# Patient Record
Sex: MALE | Race: WHITE | Employment: OTHER | URBAN - METROPOLITAN AREA
[De-identification: names, ages, dates, MRNs, and addresses within clinical notes are randomized per-mention and may not be internally consistent; named-entity substitution may affect disease eponyms.]

---

## 2017-12-26 PROBLEM — J10.1 INFLUENZA A: Status: ACTIVE | Noted: 2017-12-26

## 2017-12-26 PROBLEM — J15.9 BACTERIAL PNEUMONIA: Status: ACTIVE | Noted: 2017-12-26

## 2017-12-26 PROBLEM — J80 ARDS (ADULT RESPIRATORY DISTRESS SYNDROME) (HCC): Status: ACTIVE | Noted: 2017-12-26

## 2017-12-26 PROBLEM — A41.9 SEVERE SEPSIS (HCC): Status: ACTIVE | Noted: 2017-12-26

## 2017-12-26 PROBLEM — J96.01 ACUTE RESPIRATORY FAILURE WITH HYPOXIA (HCC): Status: ACTIVE | Noted: 2017-12-26

## 2017-12-26 PROBLEM — F41.0 PANIC DISORDER WITHOUT AGORAPHOBIA: Status: ACTIVE | Noted: 2017-12-26

## 2017-12-26 PROBLEM — R65.20 SEVERE SEPSIS (HCC): Status: ACTIVE | Noted: 2017-12-26

## 2018-01-03 ENCOUNTER — TELEPHONE (OUTPATIENT)
Dept: PHARMACY | Facility: CLINIC | Age: 68
End: 2018-01-03

## 2018-01-05 ENCOUNTER — SCHEDULED TELEPHONE ENCOUNTER (OUTPATIENT)
Dept: PHARMACY | Facility: CLINIC | Age: 68
End: 2018-01-05

## 2018-01-05 ENCOUNTER — TELEPHONE (OUTPATIENT)
Dept: PHARMACY | Facility: CLINIC | Age: 68
End: 2018-01-05

## 2018-01-05 NOTE — TELEPHONE ENCOUNTER
[address] [facility]  Heart Failure Service    Follow up phone call: Wt Readings from Last 3 Encounters:   12/27/17 179 lb 0.2 oz (81.2 kg)      I called today and spoke with his spouse. We had talked to Mr. Escobedo 1/3/18 and he refused to schedule a Well[facility] heart failure service appt. At that time. He mentioned being too dizzy and also too dizzy to weigh himself. He cut our conversation short at that time. Are you having any issues that need immediate attention? No: however, Mr. Milagro Pan continues to not feel well and has cancelled his f/u appts. Cancelled Dr. Genny Gomez 1/3/18 appt and plans to cancel [facility] 1/9/18 appt. I stressed the importance of follow up with his physicians and I stressed the seriousness of his ill health at this time and my concern for his safety. (He had Pneumonia, the flu, sepsis and heart failure, as well as elevated troponin). She expresses understanding and is concerned as well. She reports Mr. Escobedo may be having difficulty coming to terms with the term heart failure. I explained what heart failure meant. Have you been checking your weight daily? No: but she was able to weigh him today   If not, the patient was encouraged to do so. Dry weight = 160-163   What is your weight today? 163. I explained that I felt this was a reasonable weight. He came into [facility] at 167 pounds. His weight at discharge states 179. I am afraid 179 is not accurate. Please call the [employer] at [phone] or your Cardiologist ([address] [address] @ [phone]) if a weight gain of 3 pounds or more in one day  or 5 pounds or more in a week or above your dry weight. [x]  Follow your low sodium diet of 2,000-2,400mg per day. [x]  Limit how much fluid you drink  to 1,500-2,000mL (48-60 ounces) per day.     [x]  Do not take any NSAIDs (non steroidal anti inflammatory drugs) like Aleve          (naproxen), Advil and Motrin (ibuprofen), Mobic

## 2018-01-08 ENCOUNTER — TELEPHONE (OUTPATIENT)
Dept: CARDIOLOGY CLINIC | Age: 68
End: 2018-01-08

## 2018-01-08 NOTE — TELEPHONE ENCOUNTER
Dr. Manus Najjar,  [address] [facility] notes to me concerning the patient's missed appointments and the patient just \"not feeling well enough\" to attend his appointments. Spoke with the patient's wife and he is still just weak and taking a while to recover. She did cancel his appointment for tomorrow 1/9/18, however he is going to see Dr. Sarah Shah, his PCP, on 1/10/18. She states his weight is stable at 163 lbs, he does not have increased shortness of breath and/or increased swelling. She also denies he has any fevers. She said he is just weak. She was able to verbalize heart failure guidelines and said that the [facility] reviewed them at length with her. She will call our office after her 's appointment on Wednesday with an update and to reschedule.      (He had Pneumonia, the flu, sepsis and heart failure, as well as elevated troponin-during admission 12/26/17)

## 2018-01-12 ENCOUNTER — SCHEDULED TELEPHONE ENCOUNTER (OUTPATIENT)
Dept: PHARMACY | Facility: CLINIC | Age: 68
End: 2018-01-12

## 2018-01-12 NOTE — TELEPHONE ENCOUNTER
[address] [facility]  Heart Failure Service    Follow up phone call:    Reached out to Mr. Escobedo to check on his status with regards to heart failure and overall health - no answer. PLAN:   I will attempt again at a later time today. See telephone call from 1/5/18. Mr. Audrey Dunham had cancelled his f/u appts at that time due to feeling so ill. I had a lengthy conversation with his spouse at that time. Will follow up to be sure dizziness and ill feeling has resolved. Will check his weight to be sure he has not gained weight above his dry weight of 160-163 pounds.  Will encourage f/u with PCP and/or [address] [place] [address]  Heart Failure Service  [phone]

## 2018-01-12 NOTE — TELEPHONE ENCOUNTER
Mrs. Jeanine Dozier returned my call. She reports Mr. Escobedo saw Dr. Grayson Wang yesterday. She reports Mr. Escobedo health has improved, but not yet 100%. Dr. Grayson Wang referred him to a cardiologist.    She asked that we not call them any more. They will call if they have any questions.     Yaya Michaels, [address] [facility]  Heart Failure Service  [phone]

## 2018-04-11 PROBLEM — J10.1 INFLUENZA A: Status: RESOLVED | Noted: 2017-12-26 | Resolved: 2018-04-11

## 2022-01-01 ENCOUNTER — APPOINTMENT (OUTPATIENT)
Dept: CT IMAGING | Age: 72
DRG: 315 | End: 2022-01-01
Payer: MEDICARE

## 2022-01-01 ENCOUNTER — HOSPITAL ENCOUNTER (INPATIENT)
Age: 72
LOS: 2 days | DRG: 315 | End: 2022-02-04
Attending: EMERGENCY MEDICINE | Admitting: INTERNAL MEDICINE
Payer: MEDICARE

## 2022-01-01 VITALS
WEIGHT: 189.31 LBS | HEART RATE: 101 BPM | BODY MASS INDEX: 25.68 KG/M2 | SYSTOLIC BLOOD PRESSURE: 116 MMHG | RESPIRATION RATE: 20 BRPM | DIASTOLIC BLOOD PRESSURE: 81 MMHG | TEMPERATURE: 97.5 F | OXYGEN SATURATION: 70 %

## 2022-01-01 DIAGNOSIS — N17.9 ACUTE RENAL FAILURE, UNSPECIFIED ACUTE RENAL FAILURE TYPE (HCC): Primary | ICD-10-CM

## 2022-01-01 DIAGNOSIS — I73.9 PAD (PERIPHERAL ARTERY DISEASE) (HCC): ICD-10-CM

## 2022-01-01 DIAGNOSIS — I70.0 AORTIC OCCLUSION (HCC): ICD-10-CM

## 2022-01-01 DIAGNOSIS — L03.115 CELLULITIS OF RIGHT LOWER EXTREMITY: ICD-10-CM

## 2022-01-01 DIAGNOSIS — M79.604 RIGHT LEG PAIN: ICD-10-CM

## 2022-01-01 LAB
A/G RATIO: 1.1 (ref 1.1–2.2)
ALBUMIN SERPL-MCNC: 3.6 G/DL (ref 3.4–5)
ALP BLD-CCNC: 124 U/L (ref 40–129)
ALT SERPL-CCNC: 7 U/L (ref 10–40)
ANION GAP SERPL CALCULATED.3IONS-SCNC: 15 MMOL/L (ref 3–16)
APTT: 32.1 SEC (ref 26.2–38.6)
AST SERPL-CCNC: 15 U/L (ref 15–37)
BASOPHILS ABSOLUTE: 0 K/UL (ref 0–0.2)
BASOPHILS RELATIVE PERCENT: 0.7 %
BILIRUB SERPL-MCNC: 1.7 MG/DL (ref 0–1)
BUN BLDV-MCNC: 102 MG/DL (ref 7–20)
CALCIUM SERPL-MCNC: 9.4 MG/DL (ref 8.3–10.6)
CHLORIDE BLD-SCNC: 100 MMOL/L (ref 99–110)
CO2: 22 MMOL/L (ref 21–32)
CREAT SERPL-MCNC: 3 MG/DL (ref 0.8–1.3)
EKG DIAGNOSIS: NORMAL
EKG Q-T INTERVAL: 448 MS
EKG QRS DURATION: 182 MS
EKG QTC CALCULATION (BAZETT): 586 MS
EKG R AXIS: -60 DEGREES
EKG T AXIS: 122 DEGREES
EKG VENTRICULAR RATE: 103 BPM
EOSINOPHILS ABSOLUTE: 0 K/UL (ref 0–0.6)
EOSINOPHILS RELATIVE PERCENT: 0.3 %
GFR AFRICAN AMERICAN: 25
GFR NON-AFRICAN AMERICAN: 21
GLUCOSE BLD-MCNC: 95 MG/DL (ref 70–99)
GRAM STAIN RESULT: ABNORMAL
HCT VFR BLD CALC: 45.1 % (ref 40.5–52.5)
HEMOGLOBIN: 14.6 G/DL (ref 13.5–17.5)
INR BLD: 1.29 (ref 0.88–1.12)
LACTIC ACID, SEPSIS: 1.6 MMOL/L (ref 0.4–1.9)
LACTIC ACID, SEPSIS: 1.7 MMOL/L (ref 0.4–1.9)
LYMPHOCYTES ABSOLUTE: 0.6 K/UL (ref 1–5.1)
LYMPHOCYTES RELATIVE PERCENT: 15.6 %
MCH RBC QN AUTO: 32.4 PG (ref 26–34)
MCHC RBC AUTO-ENTMCNC: 32.3 G/DL (ref 31–36)
MCV RBC AUTO: 100.1 FL (ref 80–100)
MONOCYTES ABSOLUTE: 0.4 K/UL (ref 0–1.3)
MONOCYTES RELATIVE PERCENT: 11.5 %
NEUTROPHILS ABSOLUTE: 2.7 K/UL (ref 1.7–7.7)
NEUTROPHILS RELATIVE PERCENT: 71.9 %
ORGANISM: ABNORMAL
PDW BLD-RTO: 19.1 % (ref 12.4–15.4)
PLATELET # BLD: 99 K/UL (ref 135–450)
PMV BLD AUTO: 10 FL (ref 5–10.5)
POTASSIUM SERPL-SCNC: 5.9 MMOL/L (ref 3.5–5.1)
PROTHROMBIN TIME: 14.7 SEC (ref 9.9–12.7)
RBC # BLD: 4.5 M/UL (ref 4.2–5.9)
SARS-COV-2, NAAT: NOT DETECTED
SODIUM BLD-SCNC: 137 MMOL/L (ref 136–145)
TOTAL PROTEIN: 6.8 G/DL (ref 6.4–8.2)
WBC # BLD: 3.8 K/UL (ref 4–11)
WOUND/ABSCESS: ABNORMAL

## 2022-01-01 PROCEDURE — 83605 ASSAY OF LACTIC ACID: CPT

## 2022-01-01 PROCEDURE — 94761 N-INVAS EAR/PLS OXIMETRY MLT: CPT

## 2022-01-01 PROCEDURE — 6370000000 HC RX 637 (ALT 250 FOR IP): Performed by: PHYSICIAN ASSISTANT

## 2022-01-01 PROCEDURE — 2580000003 HC RX 258: Performed by: EMERGENCY MEDICINE

## 2022-01-01 PROCEDURE — 6360000002 HC RX W HCPCS: Performed by: NURSE PRACTITIONER

## 2022-01-01 PROCEDURE — 96366 THER/PROPH/DIAG IV INF ADDON: CPT

## 2022-01-01 PROCEDURE — 87635 SARS-COV-2 COVID-19 AMP PRB: CPT

## 2022-01-01 PROCEDURE — 87040 BLOOD CULTURE FOR BACTERIA: CPT

## 2022-01-01 PROCEDURE — 93010 ELECTROCARDIOGRAM REPORT: CPT | Performed by: INTERNAL MEDICINE

## 2022-01-01 PROCEDURE — 1200000000 HC SEMI PRIVATE

## 2022-01-01 PROCEDURE — 87205 SMEAR GRAM STAIN: CPT

## 2022-01-01 PROCEDURE — 87077 CULTURE AEROBIC IDENTIFY: CPT

## 2022-01-01 PROCEDURE — 86403 PARTICLE AGGLUT ANTBDY SCRN: CPT

## 2022-01-01 PROCEDURE — 93005 ELECTROCARDIOGRAM TRACING: CPT | Performed by: EMERGENCY MEDICINE

## 2022-01-01 PROCEDURE — 85730 THROMBOPLASTIN TIME PARTIAL: CPT

## 2022-01-01 PROCEDURE — 75635 CT ANGIO ABDOMINAL ARTERIES: CPT

## 2022-01-01 PROCEDURE — 6360000002 HC RX W HCPCS: Performed by: PHYSICIAN ASSISTANT

## 2022-01-01 PROCEDURE — 2580000003 HC RX 258: Performed by: PHYSICIAN ASSISTANT

## 2022-01-01 PROCEDURE — 85610 PROTHROMBIN TIME: CPT

## 2022-01-01 PROCEDURE — 2580000003 HC RX 258: Performed by: NURSE PRACTITIONER

## 2022-01-01 PROCEDURE — 85025 COMPLETE CBC W/AUTO DIFF WBC: CPT

## 2022-01-01 PROCEDURE — 6360000002 HC RX W HCPCS: Performed by: EMERGENCY MEDICINE

## 2022-01-01 PROCEDURE — 99222 1ST HOSP IP/OBS MODERATE 55: CPT | Performed by: SURGERY

## 2022-01-01 PROCEDURE — 96368 THER/DIAG CONCURRENT INF: CPT

## 2022-01-01 PROCEDURE — 99285 EMERGENCY DEPT VISIT HI MDM: CPT

## 2022-01-01 PROCEDURE — 36415 COLL VENOUS BLD VENIPUNCTURE: CPT

## 2022-01-01 PROCEDURE — 96374 THER/PROPH/DIAG INJ IV PUSH: CPT

## 2022-01-01 PROCEDURE — 99233 SBSQ HOSP IP/OBS HIGH 50: CPT | Performed by: SURGERY

## 2022-01-01 PROCEDURE — 96375 TX/PRO/DX INJ NEW DRUG ADDON: CPT

## 2022-01-01 PROCEDURE — 6360000004 HC RX CONTRAST MEDICATION: Performed by: PHYSICIAN ASSISTANT

## 2022-01-01 PROCEDURE — 96376 TX/PRO/DX INJ SAME DRUG ADON: CPT

## 2022-01-01 PROCEDURE — 94760 N-INVAS EAR/PLS OXIMETRY 1: CPT

## 2022-01-01 PROCEDURE — 80053 COMPREHEN METABOLIC PANEL: CPT

## 2022-01-01 PROCEDURE — 96367 TX/PROPH/DG ADDL SEQ IV INF: CPT

## 2022-01-01 PROCEDURE — 2500000003 HC RX 250 WO HCPCS: Performed by: EMERGENCY MEDICINE

## 2022-01-01 PROCEDURE — 87070 CULTURE OTHR SPECIMN AEROBIC: CPT

## 2022-01-01 PROCEDURE — 87186 SC STD MICRODIL/AGAR DIL: CPT

## 2022-01-01 PROCEDURE — 96365 THER/PROPH/DIAG IV INF INIT: CPT

## 2022-01-01 RX ORDER — MORPHINE SULFATE 2 MG/ML
2 INJECTION, SOLUTION INTRAMUSCULAR; INTRAVENOUS
Status: DISCONTINUED | OUTPATIENT
Start: 2022-01-01 | End: 2022-01-01 | Stop reason: HOSPADM

## 2022-01-01 RX ORDER — CALCIUM GLUCONATE 20 MG/ML
2000 INJECTION, SOLUTION INTRAVENOUS ONCE
Status: COMPLETED | OUTPATIENT
Start: 2022-01-01 | End: 2022-01-01

## 2022-01-01 RX ORDER — ATROPINE SULFATE 10 MG/ML
SOLUTION/ DROPS OPHTHALMIC
Qty: 5 ML | Refills: 4 | Status: SHIPPED | OUTPATIENT
Start: 2022-01-01

## 2022-01-01 RX ORDER — HEPARIN SODIUM 1000 [USP'U]/ML
80 INJECTION, SOLUTION INTRAVENOUS; SUBCUTANEOUS ONCE
Status: DISCONTINUED | OUTPATIENT
Start: 2022-01-01 | End: 2022-01-01

## 2022-01-01 RX ORDER — ALBUTEROL SULFATE 2.5 MG/3ML
2.5 SOLUTION RESPIRATORY (INHALATION) EVERY 6 HOURS PRN
COMMUNITY

## 2022-01-01 RX ORDER — HEPARIN SODIUM 1000 [USP'U]/ML
6300 INJECTION, SOLUTION INTRAVENOUS; SUBCUTANEOUS ONCE
Status: COMPLETED | OUTPATIENT
Start: 2022-01-01 | End: 2022-01-01

## 2022-01-01 RX ORDER — LORAZEPAM 2 MG/ML
1 CONCENTRATE ORAL EVERY 8 HOURS PRN
Qty: 30 ML | Refills: 0 | Status: SHIPPED | OUTPATIENT
Start: 2022-01-01 | End: 2022-02-23

## 2022-01-01 RX ORDER — SODIUM CHLORIDE 9 MG/ML
25 INJECTION, SOLUTION INTRAVENOUS PRN
Status: DISCONTINUED | OUTPATIENT
Start: 2022-01-01 | End: 2022-01-01 | Stop reason: HOSPADM

## 2022-01-01 RX ORDER — 0.9 % SODIUM CHLORIDE 0.9 %
1000 INTRAVENOUS SOLUTION INTRAVENOUS ONCE
Status: COMPLETED | OUTPATIENT
Start: 2022-01-01 | End: 2022-01-01

## 2022-01-01 RX ORDER — SODIUM CHLORIDE 0.9 % (FLUSH) 0.9 %
5-40 SYRINGE (ML) INJECTION PRN
Status: DISCONTINUED | OUTPATIENT
Start: 2022-01-01 | End: 2022-01-01 | Stop reason: HOSPADM

## 2022-01-01 RX ORDER — LISINOPRIL 20 MG/1
20 TABLET ORAL DAILY
Status: ON HOLD | COMMUNITY
End: 2022-01-01 | Stop reason: HOSPADM

## 2022-01-01 RX ORDER — HEPARIN SODIUM 1000 [USP'U]/ML
80 INJECTION, SOLUTION INTRAVENOUS; SUBCUTANEOUS PRN
Status: DISCONTINUED | OUTPATIENT
Start: 2022-01-01 | End: 2022-01-01

## 2022-01-01 RX ORDER — ACETAMINOPHEN 325 MG/1
650 TABLET ORAL EVERY 4 HOURS PRN
Status: DISCONTINUED | OUTPATIENT
Start: 2022-01-01 | End: 2022-01-01 | Stop reason: HOSPADM

## 2022-01-01 RX ORDER — HEPARIN SODIUM AND DEXTROSE 10000; 5 [USP'U]/100ML; G/100ML
0-3000 INJECTION INTRAVENOUS CONTINUOUS
Status: DISCONTINUED | OUTPATIENT
Start: 2022-01-01 | End: 2022-01-01

## 2022-01-01 RX ORDER — MORPHINE SULFATE 100 MG/5ML
5 SOLUTION ORAL EVERY 4 HOURS PRN
Qty: 30 ML | Refills: 0 | Status: SHIPPED | OUTPATIENT
Start: 2022-01-01 | End: 2022-03-05

## 2022-01-01 RX ORDER — MORPHINE SULFATE 4 MG/ML
4 INJECTION, SOLUTION INTRAMUSCULAR; INTRAVENOUS ONCE
Status: COMPLETED | OUTPATIENT
Start: 2022-01-01 | End: 2022-01-01

## 2022-01-01 RX ORDER — ALPRAZOLAM 0.5 MG/1
1 TABLET ORAL ONCE
Status: COMPLETED | OUTPATIENT
Start: 2022-01-01 | End: 2022-01-01

## 2022-01-01 RX ORDER — MORPHINE SULFATE 4 MG/ML
4 INJECTION, SOLUTION INTRAMUSCULAR; INTRAVENOUS EVERY 4 HOURS PRN
Status: DISCONTINUED | OUTPATIENT
Start: 2022-01-01 | End: 2022-01-01

## 2022-01-01 RX ORDER — NICOTINE 21 MG/24HR
1 PATCH, TRANSDERMAL 24 HOURS TRANSDERMAL DAILY
Status: DISCONTINUED | OUTPATIENT
Start: 2022-01-01 | End: 2022-01-01 | Stop reason: HOSPADM

## 2022-01-01 RX ORDER — SODIUM CHLORIDE 0.9 % (FLUSH) 0.9 %
5-40 SYRINGE (ML) INJECTION EVERY 12 HOURS SCHEDULED
Status: DISCONTINUED | OUTPATIENT
Start: 2022-01-01 | End: 2022-01-01 | Stop reason: HOSPADM

## 2022-01-01 RX ORDER — LORAZEPAM 2 MG/ML
1 INJECTION INTRAMUSCULAR
Status: DISCONTINUED | OUTPATIENT
Start: 2022-01-01 | End: 2022-01-01 | Stop reason: HOSPADM

## 2022-01-01 RX ORDER — ALBUTEROL SULFATE 2.5 MG/3ML
2.5 SOLUTION RESPIRATORY (INHALATION) EVERY 6 HOURS PRN
Status: DISCONTINUED | OUTPATIENT
Start: 2022-01-01 | End: 2022-01-01 | Stop reason: HOSPADM

## 2022-01-01 RX ORDER — HEPARIN SODIUM 1000 [USP'U]/ML
40 INJECTION, SOLUTION INTRAVENOUS; SUBCUTANEOUS PRN
Status: DISCONTINUED | OUTPATIENT
Start: 2022-01-01 | End: 2022-01-01

## 2022-01-01 RX ORDER — FUROSEMIDE 80 MG
80 TABLET ORAL DAILY
Status: ON HOLD | COMMUNITY
End: 2022-01-01 | Stop reason: HOSPADM

## 2022-01-01 RX ADMIN — MORPHINE SULFATE 2 MG: 2 INJECTION, SOLUTION INTRAMUSCULAR; INTRAVENOUS at 11:52

## 2022-01-01 RX ADMIN — IOPAMIDOL 75 ML: 755 INJECTION, SOLUTION INTRAVENOUS at 14:09

## 2022-01-01 RX ADMIN — MORPHINE SULFATE 4 MG: 4 INJECTION INTRAVENOUS at 20:12

## 2022-01-01 RX ADMIN — ALPRAZOLAM 1 MG: 0.5 TABLET ORAL at 18:22

## 2022-01-01 RX ADMIN — LORAZEPAM 1 MG: 2 INJECTION INTRAMUSCULAR; INTRAVENOUS at 00:26

## 2022-01-01 RX ADMIN — VANCOMYCIN HYDROCHLORIDE 1000 MG: 1 INJECTION, POWDER, LYOPHILIZED, FOR SOLUTION INTRAVENOUS at 16:05

## 2022-01-01 RX ADMIN — HEPARIN SODIUM 6300 UNITS: 1000 INJECTION INTRAVENOUS; SUBCUTANEOUS at 17:10

## 2022-01-01 RX ADMIN — CALCIUM GLUCONATE 2000 MG: 20 INJECTION, SOLUTION INTRAVENOUS at 20:37

## 2022-01-01 RX ADMIN — SODIUM CHLORIDE 1000 ML: 9 INJECTION, SOLUTION INTRAVENOUS at 15:31

## 2022-01-01 RX ADMIN — LORAZEPAM 1 MG: 2 INJECTION INTRAMUSCULAR; INTRAVENOUS at 04:03

## 2022-01-01 RX ADMIN — MORPHINE SULFATE 2 MG: 2 INJECTION, SOLUTION INTRAMUSCULAR; INTRAVENOUS at 08:01

## 2022-01-01 RX ADMIN — SODIUM CHLORIDE, PRESERVATIVE FREE 10 ML: 5 INJECTION INTRAVENOUS at 08:02

## 2022-01-01 RX ADMIN — LORAZEPAM 1 MG: 2 INJECTION INTRAMUSCULAR; INTRAVENOUS at 06:07

## 2022-01-01 RX ADMIN — LORAZEPAM 1 MG: 2 INJECTION INTRAMUSCULAR; INTRAVENOUS at 09:51

## 2022-01-01 RX ADMIN — MORPHINE SULFATE 2 MG: 2 INJECTION, SOLUTION INTRAMUSCULAR; INTRAVENOUS at 00:30

## 2022-01-01 RX ADMIN — LORAZEPAM 1 MG: 2 INJECTION INTRAMUSCULAR; INTRAVENOUS at 00:45

## 2022-01-01 RX ADMIN — CEFEPIME HYDROCHLORIDE 2000 MG: 2 INJECTION, POWDER, FOR SOLUTION INTRAVENOUS at 15:30

## 2022-01-01 RX ADMIN — MORPHINE SULFATE 2 MG: 2 INJECTION, SOLUTION INTRAMUSCULAR; INTRAVENOUS at 04:03

## 2022-01-01 RX ADMIN — Medication 50 MEQ: at 20:11

## 2022-01-01 RX ADMIN — SODIUM CHLORIDE, PRESERVATIVE FREE 10 ML: 5 INJECTION INTRAVENOUS at 23:14

## 2022-01-01 RX ADMIN — HEPARIN SODIUM 1400 UNITS/HR: 10000 INJECTION INTRAVENOUS; SUBCUTANEOUS at 17:11

## 2022-01-01 ASSESSMENT — PAIN DESCRIPTION - DESCRIPTORS
DESCRIPTORS: ACHING
DESCRIPTORS: ACHING

## 2022-01-01 ASSESSMENT — PAIN SCALES - GENERAL
PAINLEVEL_OUTOF10: 8
PAINLEVEL_OUTOF10: 8
PAINLEVEL_OUTOF10: 0
PAINLEVEL_OUTOF10: 8
PAINLEVEL_OUTOF10: 5
PAINLEVEL_OUTOF10: 2
PAINLEVEL_OUTOF10: 8
PAINLEVEL_OUTOF10: 0
PAINLEVEL_OUTOF10: 0
PAINLEVEL_OUTOF10: 8
PAINLEVEL_OUTOF10: 2

## 2022-01-01 ASSESSMENT — PAIN DESCRIPTION - ORIENTATION
ORIENTATION: RIGHT

## 2022-01-01 ASSESSMENT — ENCOUNTER SYMPTOMS
COLOR CHANGE: 1
ABDOMINAL PAIN: 1
VOMITING: 0
SHORTNESS OF BREATH: 0
NAUSEA: 0

## 2022-01-01 ASSESSMENT — PAIN DESCRIPTION - PAIN TYPE
TYPE: ACUTE PAIN;CHRONIC PAIN

## 2022-01-01 ASSESSMENT — PAIN DESCRIPTION - LOCATION
LOCATION: LEG

## 2022-01-01 ASSESSMENT — PAIN DESCRIPTION - FREQUENCY: FREQUENCY: INTERMITTENT

## 2022-02-02 PROBLEM — I70.0 OCCLUSION OF AORTA (HCC): Status: ACTIVE | Noted: 2022-01-01

## 2022-02-02 NOTE — ED NOTES
Bed: C-21  Expected date:   Expected time:   Means of arrival:   Comments:  [facility] open wound on leg     Pablo Burris RN  02/02/22 [id number]

## 2022-02-02 NOTE — CONSULTS
Clinical Pharmacy Note  Vancomycin Consult    Pharmacy consult received for one-time dose of vancomycin in the Emergency Department per [facility]. Ht Readings from Last 1 Encounters:   12/26/17 6' (1.829 m)        Wt Readings from Last 1 Encounters:   02/02/22 175 lb (79.4 kg)         Assessment/Plan:   Vancomycin 1000 mg IV x 1 in ED.  If Vancomycin is to continue on admission and pharmacy is to manage dosing, please re-consult with admission orders.   Modesta Zendejas [facility],2/2/2022,2:12 PM

## 2022-02-02 NOTE — ED PROVIDER NOTES
[address] [facility]      Pt Name: Yamilet Kelly  MRN: [id number]  [place] 1950  Date of evaluation: 2/2/2022  Provider: JANNY Calhoun       I have seen and evaluated this patient with my supervising physician Khadar Mayen MD.    CHIEF COMPLAINT     Right leg wound and pain      HISTORY OF PRESENT ILLNESS  (Location/Symptom, Timing/Onset, Context/Setting, Quality, Duration, Modifying Factors, Severity.)   Yamilet Kelly is a 67 y.o. male who presents to the emergency department for right leg wound and pain. Patient is a very poor historian. He reports leg wound and foot pain have been present for few days. denies fever, chills, chest pain, shortness of breath, nausea, vomiting. Has had some abdominal pain recently. Denies hx DM. Nursing Notes were reviewed and I agree. REVIEW OF SYSTEMS    (2-9 systems for level 4, 10 or more for level 5)     Review of Systems   Constitutional: Negative for chills and fever. Respiratory: Negative for shortness of breath. Cardiovascular: Negative for chest pain. Gastrointestinal: Positive for abdominal pain. Negative for nausea and vomiting. Skin: Positive for color change, pallor and wound. PAST MEDICAL HISTORY         Diagnosis Date    CAD (coronary artery disease)     Hypertension        SURGICAL HISTORY           Procedure Laterality Date    VASCULAR SURGERY  2016    AAA Repair       CURRENT MEDICATIONS       Previous Medications    ALBUTEROL (PROVENTIL) (2.5 MG/3ML) 0.083% NEBULIZER SOLUTION    Take 2.5 mg by nebulization every 6 hours as needed for Wheezing    ALBUTEROL SULFATE  (90 BASE) MCG/ACT INHALER    Inhale 2 puffs into the lungs every 6 hours as needed for Wheezing    ALPRAZOLAM (XANAX) 2 MG TABLET    Take 1 mg by mouth 5 times daily.  Usually takes later in the day/evening    CARVEDILOL (COREG) 25 MG TABLET    Take 25 mg by mouth daily in the right foot  1+ PT pulse on the left  Right lower leg has large wound with some blistering that appears to have ruptured with surrounding erythema. No wounds on the feet  No TTP of the feet    Neurological:      Mental Status: He is alert. Psychiatric:      Comments: Poor historian                         DIFFERENTIAL DIAGNOSIS   Arterial occlusion, PAD, sepsis, other    DIAGNOSTICRESULTS         RADIOLOGY:   Non-plain film images such as CT, Ultrasound and MRI are read by the radiologist. Plain radiographic images are visualized and preliminarily interpreted by JANNY Zuniga with the below findings:      Interpretation per the Radiologist below, if available at the time of this note:    CTA ABDOMINAL AORTA W BILAT RUNOFF W CONTRAST   Final Result   Severe inflow disease with occlusion of the abdominal aorta just below the   takeoff of the superior mesenteric artery, occlusion of the aorto bi-iliac   stent graft and both common, internal, and external iliac arteries. Outflow disease bilaterally with moderate to severe stenosis of the right   superficial femoral artery at its origin and moderate stenosis of the left   superficial femoral artery at its origin. There is mild to moderate   multilevel stenosis of the superficial femoral arteries which remain patent. Runoff vessels are diminutive with single vessel runoff to the left foot and   3 vessel runoff to the right foot. Small right and trace left pleural effusions. Fluid is seen within nondilated small large bowel and there appears to be   mild wall thickening involving the ascending colon, poorly evaluated due to   adjacent fluid and lack of oral contrast.  Findings may be related to mild   enterocolitis. Small volume ascites.                LABS:  Results for orders placed or performed during the hospital encounter of 02/02/22   COVID-19, Rapid    Specimen: Nasopharyngeal Swab   Result Value Ref Range SARS-CoV-2, NAAT Not Detected Not Detected   CBC Auto Differential   Result Value Ref Range    WBC 3.8 (L) 4.0 - 11.0 K/uL    RBC 4.50 4. 20 - 5.90 M/uL    Hemoglobin 14.6 13.5 - 17.5 g/dL    Hematocrit 45.1 40.5 - 52.5 %    .1 (H) 80.0 - 100.0 fL    MCH 32.4 26.0 - 34.0 pg    MCHC 32.3 31.0 - 36.0 g/dL    RDW 19.1 (H) 12.4 - 15.4 %    Platelets 99 (L) 053 - 450 K/uL    MPV 10.0 5.0 - 10.5 fL    Neutrophils % 71.9 %    Lymphocytes % 15.6 %    Monocytes % 11.5 %    Eosinophils % 0.3 %    Basophils % 0.7 %    Neutrophils Absolute 2.7 1.7 - 7.7 K/uL    Lymphocytes Absolute 0.6 (L) 1.0 - 5.1 K/uL    Monocytes Absolute 0.4 0.0 - 1.3 K/uL    Eosinophils Absolute 0.0 0.0 - 0.6 K/uL    Basophils Absolute 0.0 0.0 - 0.2 K/uL   Comprehensive Metabolic Panel   Result Value Ref Range    Sodium 137 136 - 145 mmol/L    Potassium 5.9 (H) 3.5 - 5.1 mmol/L    Chloride 100 99 - 110 mmol/L    CO2 22 21 - 32 mmol/L    Anion Gap 15 3 - 16    Glucose 95 70 - 99 mg/dL     (HH) 7 - 20 mg/dL    CREATININE 3.0 (H) 0.8 - 1.3 mg/dL    GFR Non-African American 21 (A) >60    GFR  25 (A) >60    Calcium 9.4 8.3 - 10.6 mg/dL    Total Protein 6.8 6.4 - 8.2 g/dL    Albumin 3.6 3.4 - 5.0 g/dL    Albumin/Globulin Ratio 1.1 1.1 - 2.2    Total Bilirubin 1.7 (H) 0.0 - 1.0 mg/dL    Alkaline Phosphatase 124 40 - 129 U/L    ALT 7 (L) 10 - 40 U/L    AST 15 15 - 37 U/L   Protime-INR   Result Value Ref Range    Protime 14.7 (H) 9.9 - 12.7 sec    INR 1.29 (H) 0.88 - 1.12   APTT   Result Value Ref Range    aPTT 32.1 26.2 - 38.6 sec   Lactate, Sepsis   Result Value Ref Range    Lactic Acid, Sepsis 1.7 0.4 - 1.9 mmol/L   EKG 12 Lead   Result Value Ref Range    Ventricular Rate 103 BPM    Atrial Rate 61 BPM    QRS Duration 182 ms    Q-T Interval 448 ms    QTc Calculation (Bazett) 586 ms    R Axis -60 degrees    T Axis 122 degrees    Diagnosis       Wide QRS rhythm with frequent and consecutive Premature ventricular complexesLeft axis deviationLeft bundle branch blockAbnormal ECGWhen compared with ECG of 26-DEC-2017 08:57,Wide QRS rhythm has replaced Sinus rhythm       All other labs were withinnormal range or not returned as of this dictation. EMERGENCY DEPARTMENT COURSE and DIFFERENTIAL DIAGNOSIS/MDM:   Vitals:    Vitals:    02/02/22 1500 02/02/22 1515 02/02/22 1530 02/02/22 1608   BP: 101/77  102/80 101/82   Pulse: 101 102 106 92   Resp: 23 18 19 21   Temp:   97.9 °F (36.6 °C) 97.9 °F (36.6 °C)   TempSrc:   Oral Oral   SpO2: 94% 94% 94% 94%   Weight:           Patient was nontoxic, well appearing, afebrile with normal vital signs. Saturating well on room air. Concern for arterial occlusion given his bilateral ischemic appearing toes. CTA ordered. Labs remarkable for ARF with creatinine 3.0. Liter of IVF ordered. Patient was signed out to Dr. Kemi Marshall. See his note for further ED course, treatment and disposition. PROCEDURES:  None    FINAL IMPRESSION      1. Acute renal failure, unspecified acute renal failure type ([address]    2. PAD (peripheral artery disease) ([facility] [address]          DISPOSITION/PLAN   DISPOSITION Decision To Admit 02/02/2022 04:24:39 PM      PATIENT REFERRED TO:  No follow-up provider specified.     DISCHARGE MEDICATIONS:  New Prescriptions    No medications on file       (Please note that portions of this note werecompleted with a voice recognition program.  Efforts were made to edit the dictations but occasionally words are mis-transcribed.)    Frankey Pries, [address] [place] [address]  02/02/22 1630

## 2022-02-02 NOTE — ED PROVIDER NOTES
I have personally performed a face to face diagnostic evaluation on this patient. I have fully participated in the care of this patient. I have reviewed and agree with all pertinent clinical information including history, physical exam, diagnostic tests, and the plan. HPI: Andrade Giordano presented with bilateral leg and foot pain. History of AAA with repair. History of cardiomyopathy, hypertension, hypercholesterolemia. History of abdominal aortic aneurysm repair. Legs are clear bilaterally they have been getting worse. Patient reports he has an ulcer and pain to his right anterior leg. See NAI note for further details. Chief Complaint   Patient presents with    Leg Pain      Review of Systems: See NAI note  Vital Signs: /86   Pulse 97   Temp 97.8 °F (36.6 °C) (Oral)   Resp 19   Wt 175 lb (79.4 kg)   SpO2 94%   BMI 23.73 kg/m²     Alert 67 y.o. male who does not appear toxic or acutely ill  HENT: Atraumatic, oral mucosa moist  Neck: Grossly normal ROM  Chest/Lungs: respiratory effort normal   Abdomen: Soft nontender, no pulsatile mass noted  Extremities: 2+ radial bilaterally, unable to palpate or Doppler bilateral DP and PTs, good perfusion to feet bilaterally despite unable to palpate pulses. Significant right lower extremity ulcer with surrounding cellulitis  Musculoskeletal: Grossly normal ROM  Skin: No palor or diaphoresis    Medical Decision Making and Plan:  Pertinent Labs & Imaging studies reviewed. (See NAI chart for details)  I agree with assessment and plan. Concern for arterial insufficiency with vascular ulcer and surrounding cellulitis. Will obtain labs as well as emergent CTA abdomen with bilateral lower extremity runoffs. See NAI note for further details. Update 1545:   CTA shows complete occlusion of abdominal aorta with concern for thrombus. Decreased blood flow to bilateral kidneys likely reason for patient's GABRIELA. Found to have hyperkalemia.   Will treat with bicarb and calcium. EKG with some signs of widening QRS. Patient has CTA which shows low flow to the kidneys which is likely as cause of acute kidney function. Patient has received antibiotics. Will emergently consult vascular surgery. Patient to require heparin. Dr. Dakotah Correa with vascular recommends High Dose Heparin. Patient will still require the OR however awaiting final recommendation. Vitals unchanged. To be admitted. Update 5:27 PM  Dr. Perla Salcedo was down in the ER and I had extensive discussion with him as well as the patient and patient's wife. Surgery to fix his problem is likely too risky to complete it would require major open surgery and may not be successful. Patient has had palliative care and hospice care evaluations at his house and wife would like to discuss this with her . She is not currently at the ER but she is coming in. When she arrives at the ER we will discuss possible options for admission versus discharge with hospice    Update 6:30 PM  I had extensive conversation with patient's wife regarding potential options and/or hospice care. She states they have consulted with hospice [facility]. She does not think that he will be able to go home. Wants to think about potential options. Patient states he does not want surgery. Update 7:10 PM  Had another discussion with wife. She would like us to call [facility]. Patient was made DNR [facility]. She does not feel comfortable sending him home as he cannot walk. Patient is still receiving heparin. Will give him Xanax for anxiety which she states he takes regularly. Update 7:40 PM  Family including daughter now at bedside. Patient cannot walk. [facility] was consulted unknown if we will be able to get patient to inpatient hospice this evening. Patient is requesting pain medicine.     EKG Interpretation    Interpreted by emergency department physician    Rhythm: Wide QRS rhythm  Rate: normal  Axis: left  Ectopy: Premature ventricular contractions  Conduction: left bundle branch block (complete)  ST Segments: nonspecific changes  T Waves: non specific changes   Q Waves: none    Clinical Impression: Wide QRS with left bundle branch block and premature ventricular complexes without signs of acute ischemia    CRITICAL CARE TIME   Total Critical Care time was 42 minutes, excluding separately reportable procedures. There was a high probability of clinically significant/life threatening deterioration in the patient's condition which required my urgent intervention. This includes multiple reevaluations, vital sign monitoring, pulse oximetry monitoring, telemetry monitoring, clinical response to the IV medications, reviewing the nursing notes, consultation time, dictation/documentation time, and interpretation of the labwork. (This time excludes time spent performing procedures).       MD Austin Vang MD  02/02/22 2006

## 2022-02-02 NOTE — PROGRESS NOTES
Clinical Pharmacy Note  Heparin Dosing Consult    Alex Kirby is a 67 y.o. male ordered heparin per high dose nomogram by Kory Stanley. Physician wants high dose heparin at this time. 1. History of 8 cm abdominal aortic aneurysm fixed with a stent graft in 2016 at [facility]  2. 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 and His aortic stent graft it is hard to say that this is acute since the patient denies leg pain belly pain or anything that is acute      Lab Results   Component Value Date    APTT 32.1 02/02/2022     Lab Results   Component Value Date    HGB 14.6 02/02/2022    HCT 45.1 02/02/2022    PLT 99 02/02/2022    INR 1.29 02/02/2022       Ht Readings from Last 1 Encounters:   12/26/17 6' (1.829 m)        Wt Readings from Last 1 Encounters:   02/02/22 175 lb (79.4 kg)        Assessment/Plan:  Initial bolus: 6300 units  Initial infusion rate: 1400 units/hr  Next aPTT: 2300 2-2-22    Pharmacy will continue to monitor adjust heparin based on aPTT results using nomogram below:     HIGH DOSE HEPARIN PROTOCOL (DVT/PE)     Initial Bolus: 80 units/kg Max Bolus: 10,000 units       Initial Rate: 18 units/kg/hr Max Initial Rate: 2,750 units/hr     aPTT < 45   Heparin 80 units/kg bolus Increase infusion by 4 units/kg/hr       (maximum 10,000 units)   aPTT 45-59.9   Heparin 40 units/kg bolus Increase infusion by 2 units/kg/hr       (maximum 5,000 units)   aPTT 60-90   No bolus   No change   aPTT 90.1-97.5 No bolus   Decrease infusion by 1 units/kg/hr   aPTT 97.6-105  No bolus   Decrease infusion by 2 units/kg/hr   aPTT > 105    Hold heparin for 1 hour Decrease infusion by 3 units/kg/hr    Obtain aPTT 6 hours after initial bolus and 6 hours after any dose change until two consecutive therapeutic aPTTs are achieved - then daily.     [address] [place] [address] 2/2/2022  6:40 PM

## 2022-02-02 NOTE — CONSULTS
Surgery Consult Note     Aristeo Agee MD,   Pt Name: Kathrine Adan  MRN: [id number]  YOB: 1950  Date of evaluation: 2/2/2022  Primary Care Physician: Dustin Cassidy  Referring Physician. Kory Escobar and Dr. Logan Atkins  Reason for Consultation: Aortic occlusion  Chief Complaint: Right leg pain and ulceration  IMPRESSIONS:   1. History of 8 cm abdominal aortic aneurysm fixed with a stent graft in 2016 at [facility]  2. 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 Worsening renal failure  4. Cardiomyopathy with ejection fraction of 25%  5. Patient says he does not want surgery and would not do dialysis  6. Patient is not the best historian  7. does not have any pertinent problems on file. PLANS:   1. The operation to try to improve his situation would be an axillobifemoral graft, however reviewing his previous leg scans he has severe arterial disease in both legs as well as total inflow occlusion. We cannot reopen his renal arteries nor do a thrombectomy of his clotted aorta as the stent graft is no longer approximated to the aneurysm wall. 2. Dr. Vona Lesches is discussed this with the patient wife. And we both have discussed with the patient. Our recommendation is not to operate. 3. He has nodes from palliative care from the last year sounds like his quality of life is limited my recommendation would be to keep him comfortable  SUBJECTIVE:   History of Chief Complaint:    Kathrine Adan is a 67 y.o. male who presents with right leg pain and ulceration. Symptom onset has been acute for a time period of 1=-2 day(s). Severity is described as mild-moderate. Course of his symptoms over time is acute. . This has been occuring for 2 days, moderate and have gradually worsened. Aggravating factors include activity.  Alleviating factors include none. Associated symptoms include none. He admits to history of previous surgery including abdominal aortic aneurysm repair at [facility] 2016 with a stent graft and right testicular surgery for undescended testicle and renal insufficiency Long-term smoker and denies history of hepatitis, inflammatory bowel disease, pancreatitis, jaundice, colitis and ulcer disease. Previous studies include CT scan. Past Medical History  Reviewed  has a past medical history of CAD (coronary artery disease) and Hypertension. Past Surgical History  Reviewed has a past surgical history that includes vascular surgery (2016). Medications  Prior to Admission medications    Medication Sig Start Date End Date Taking? Authorizing Provider   lisinopril (PRINIVIL;ZESTRIL) 20 MG tablet Take 20 mg by mouth daily   Yes Historical Provider, MD   furosemide (LASIX) 80 MG tablet Take 80 mg by mouth daily   Yes Historical Provider, MD   albuterol (PROVENTIL) (2.5 MG/3ML) 0.083% nebulizer solution Take 2.5 mg by nebulization every 6 hours as needed for Wheezing   Yes Historical Provider, MD   carvedilol (COREG) 25 MG tablet Take 25 mg by mouth daily    Yes Historical Provider, MD   ALPRAZolam [place] Juanito) 2 MG tablet Take 1 mg by mouth 5 times daily. Usually takes later in the day/evening   Yes Historical Provider, MD   traZODone (DESYREL) 50 MG tablet Take 100 mg by mouth nightly    Yes Historical Provider, MD   albuterol sulfate  (90 Base) MCG/ACT inhaler Inhale 2 puffs into the lungs every 6 hours as needed for Wheezing   Yes Historical Provider, MD    Scheduled Meds:   ALPRAZolam  1 mg Oral Once    nicotine  1 patch TransDERmal Daily     Continuous Infusions:   heparin (PORCINE) Infusion 1,400 Units/hr (02/02/22 1711)     PRN Meds:. Allergies  is allergic to pcn [penicillins]. Family History  Reviewedfamily history is not on file. Social History   reports that he has been smoking cigarettes.  He has a 48.00 pack-year smoking history. He has never used smokeless tobacco. He reports current alcohol use. He reports that he does not use drugs. EDUCATION  Patient educated about their illness/diagnosis, stated above, and all questions answered. We discussed the importance of nutrition, medications they are taking, and healthy lifestyle. Review of Systems:  General Denies any fever or chills  HEENT Denies any diplopia, tinnitus or vertigo  Resp says he had oxygen at home but he got rid of it  Denies any shortness of breath, cough or wheezing  Cardiac Denies any chest pain, palpitations, claudication or edema  GI Denies any melena, hematochezia, hematemesis or pyrosis   Denies any frequency, urgency, hesitancy or incontinence  Heme Denies bruising or bleeding easily  Neuro Denies any focal motor or sensory deficits  OBJECTIVE:   VITALS:  weight is 175 lb (79.4 kg). His oral temperature is 97.9 °F (36.6 °C). His blood pressure is 100/90 (abnormal) and his pulse is 97. His respiration is 16 and oxygen saturation is 95%. CONSTITUTIONAL: Looks a bit disheveled and Alert and oriented times 3 but not a good historian, no acute distress and cooperative to examination with proper mood and affect. SKIN: Skin color, texture, turgor normal. No rashes or lesions. LYMPH: no cervical nodes, no inguinal nodes  HEENT: Head is normocephalic, atraumatic. EOMI, PERRLA. NECK: Supple, symmetrical, trachea midline, no adenopathy, thyroid symmetric, not enlarged and no tenderness, skin normal.  CHEST/LUNGS: chest symmetric with normal A/P diameter, normal respiratory rate and rhythm, lungs clear to auscultation without wheezes, rales or rhonchi. No accessory muscle use. Scars None   CARDIOVASCULAR: Heart sounds are normal. IRRegular rate and rhythm without murmur, gallop or rub.  Normal S1 and S2. femoral pulses no palpable pulses weak Doppler signals, no distal pulses no Doppler signals distally dorsalis pedis posterior tibial bilaterally  ABDOMEN: Normal shape. right lower quadrant, No and Laparoscopic scar(s) present. Normal bowel sounds. No bruits. . soft, nontender, nondistended, no masses or organomegaly. no evidence of hernia. Percussion: Normal without hepatosplenomegally. Tenderness: absent. RECTAL: deferred, not clinically indicated  NEUROLOGIC: There are no focalizing motor or sensory deficits. CN II-XII are grossly intact. Fernando Lever EXTREMITIES: no clubbing and ulcers and blisters on the right leg which is swollen right leg is also a purplish discoloration and cool left leg does not look that bad.   LABS:     Recent Labs     02/02/22  1355   WBC 3.8*   HGB 14.6   HCT 45.1   PLT 99*      K 5.9*      CO2 22   *   CREATININE 3.0*   CALCIUM 9.4   INR 1.29*   AST 15   ALT 7*   BILITOT 1.7*     Recent Labs     02/02/22  1355   ALKPHOS 124   ALT 7*   AST 15   BILITOT 1.7*   LABALBU 3.6     CBC:   Lab Results   Component Value Date    WBC 3.8 02/02/2022    RBC 4.50 02/02/2022    HGB 14.6 02/02/2022    HCT 45.1 02/02/2022    .1 02/02/2022    MCH 32.4 02/02/2022    MCHC 32.3 02/02/2022    RDW 19.1 02/02/2022    PLT 99 02/02/2022    MPV 10.0 02/02/2022     CMP:    Lab Results   Component Value Date     02/02/2022    K 5.9 02/02/2022     02/02/2022    CO2 22 02/02/2022     02/02/2022    CREATININE 3.0 02/02/2022    GFRAA 25 02/02/2022    AGRATIO 1.1 02/02/2022    LABGLOM 21 02/02/2022    GLUCOSE 95 02/02/2022    PROT 6.8 02/02/2022    LABALBU 3.6 02/02/2022    CALCIUM 9.4 02/02/2022    BILITOT 1.7 02/02/2022    ALKPHOS 124 02/02/2022    AST 15 02/02/2022    ALT 7 02/02/2022     BMP:    Lab Results   Component Value Date     02/02/2022    K 5.9 02/02/2022     02/02/2022    CO2 22 02/02/2022     02/02/2022    LABALBU 3.6 02/02/2022    CREATININE 3.0 02/02/2022    CALCIUM 9.4 02/02/2022    GFRAA 25 02/02/2022    LABGLOM 21 02/02/2022    GLUCOSE 95 02/02/2022     Urine Culture:  No components found for: EMRE  Blood Culture:  No components found for: CBLOOD, CFUNGUSBL  RADIOLOGY:     CT scan abd/pelvis: See radiology report films reviewed with Dr. 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 Further recommendations to follow.       General and Vascular Surgery [phone]  Electronically signed by Abby Archibald MD on 2/2/2022 at 5:55 PM

## 2022-02-02 NOTE — ED NOTES
Pharmacy Medication Reconciliation Note     List of medications patient is currently taking is complete. Source of information:   1. Patient instructed me to call his wife    Notes regarding home medications:   1. Reports he has only taken alprazolam 1 mg today  thus far  2. He takes usually 4-5 mg/day and has been taking moreso in the afternoon/evening for severe anxiety and PTSD. He has been taking this for quite some time. Wife informed me he would have withdrawal from it if he does not get it. May also be combative  3. Has only been smoking ~ 4 cigarettes per day. May need nicotine patch  4. Took himself off atorvastatin and amlodipine  5. Has not taken any of his other medications since Saturday/Sunday due to how he has been feeling.      Denies taking any other OTC or herbal medications    Sharmin Billingsley, Parvin, BCPS  2/2/2022  2:51 PM

## 2022-02-03 NOTE — CARE COORDINATION
Talked with daughter Salvador Chicas, who is still in the room. [facility] is their hospice of choice. Referral sent & call make to [address]. Mary Pepper RN, BSN,   [phone]  Electronically signed by Mary Pepper RN on 2/3/2022 at 7:50 AM     Notified by bedside nurse that family wants home with hospice. Will update HOC. Mary Pepper RN, BSN,   [phone]  Electronically signed by Mray Pepper RN on 2/3/2022 at 8:44 AM     Talked with Tutu Topete from [address], plan is to take pt home tomorrow if DMEs can be delivered & if transportation can be arranged, all weather dependent.     Mary Pepper RN, BSN,   [phone]  Electronically signed by Mary Pepper RN on 2/3/2022 at 12:28 PM

## 2022-02-03 NOTE — ED NOTES
Per Dr. Wiley Karimi, ER physician. Patient to be admitted and hospice of [facility] to see patient and family tomorrow morning. Family unable to take patient home and care for him at this time. Patient not wanting to be direct admitted to hospice [facility] at this time. Patient and family in agreement for patient to be admitted here to [facility]. Patient can become combative. Per family patient has hx of PTSD and takes prescribed xanax 4-5mg per day. Patient was given 1mg here in ER see MAR and order.       Ibis Butler, KERRY  02/02/22 0348

## 2022-02-03 NOTE — H&P
Hospital Medicine History & Physical      PCP: Shubham Osman MD    Date of Admission: 2/2/2022    Date of Service: Pt seen/examined on 2/2/2022 and Admitted to Inpatient with expected LOS greater than two midnights due to medical therapy. Chief Complaint:  Bilateral leg pain      History Of Present Illness:      67 y.o. male with PMHx of CAD, AAA repair 2016, cardiomyopathy EF 25%, HTN, HLD and PAD presented to [facility] with bilateral leg and foot pain. Pain has progressively worsened and has developed ulcer to right lower leg. Patient denies fever, chills, nausea or vomiting. He does report some abdominal pain over the last few days around the same time his leg pain began. He is poor historian and difficult to obtain history. ED work-up revealed occlusion of the abdominal aorta with concern for thrombus. Decreased blood flow to bilateral kidneys with GABRIELA. Patient also has hyperkalemia which was treated. ED provider discussed the case with vascular surgery and a heparin drip was initiated. Multiple conversations were had with vascular surgery, ED physician and patient who decided he did not want surgery. Patient was admitted for comfort care measures and palliative care. We will consult hospice. Past Medical History:          Diagnosis Date    CAD (coronary artery disease)     Hypertension        Past Surgical History:          Procedure Laterality Date    VASCULAR SURGERY  2016    AAA Repair       Medications Prior to Admission:      Prior to Admission medications    Medication Sig Start Date End Date Taking?  Authorizing Provider   lisinopril (PRINIVIL;ZESTRIL) 20 MG tablet Take 20 mg by mouth daily   Yes Historical Provider, MD   furosemide (LASIX) 80 MG tablet Take 80 mg by mouth daily   Yes Historical Provider, MD   albuterol (PROVENTIL) (2.5 MG/3ML) 0.083% nebulizer solution Take 2.5 mg by nebulization every 6 hours as needed for Wheezing   Yes Historical Provider, MD   carvedilol (COREG) 25 MG tablet Take 25 mg by mouth daily    Yes Historical Provider, MD   ALPRAZolam [place]) 2 MG tablet Take 1 mg by mouth 5 times daily. Usually takes later in the day/evening   Yes Historical Provider, MD   traZODone (DESYREL) 50 MG tablet Take 100 mg by mouth nightly    Yes Historical Provider, MD   albuterol sulfate  (90 Base) MCG/ACT inhaler Inhale 2 puffs into the lungs every 6 hours as needed for Wheezing   Yes Historical Provider, MD       Allergies:  Pcn [penicillins]    Social History:      The patient currently lives home with spouse    TOBACCO:   reports that he has been smoking cigarettes. He has a 48.00 pack-year smoking history. He has never used smokeless tobacco.  ETOH:   reports current alcohol use. Family History:      Reviewed in detail positive as follows:    History reviewed. No pertinent family history. REVIEW OF SYSTEMS:   Pertinent positives as noted in the HPI. All other systems reviewed and negative. PHYSICAL EXAM PERFORMED:    /86   Pulse 101   Temp 97.4 °F (36.3 °C) (Axillary)   Resp 24   Wt 175 lb (79.4 kg)   SpO2 90%   BMI 23.73 kg/m²     General appearance: Chronically ill-appearing  male sitting upright in bedside chair uncomfortable in appearance, appears much older than stated age. HEENT:  Normal cephalic, atraumatic without obvious deformity. Pupils equal, round, and reactive to light. Conjunctivae/corneas clear. Neck: Supple, with full range of motion. No jugular venous distention. Trachea midline. Respiratory:  Normal respiratory effort. Diminished bilaterally without accessory muscle use. Cardiovascular:  Regular rate and rhythm, no lower extremity edema. Abdomen: Soft, diffusely tender, without rebound or guarding. Normal bowel sounds. Musculoskeletal:  Moves all extremities equally. Full range of motion, right anterior shin with multiple blisters which now have ruptured and surrounding erythema.   No palpable pulses 2 dorsalis pedis or posterior tibial.  Cyanotic toes bilaterally right worse than the left. Skin: As noted above, otherwise skin warm, dry and intact. Neurologic:  Neurovascularly intact without any focal sensory/motor deficits. Cranial nerves: II-XII intact, grossly non-focal.  Psychiatric:  Alert and oriented, thought content appropriate, normal insight  Capillary Refill: Brisk,< 3 seconds   Peripheral Pulses: +2 palpable bilateral upper extremities, equal bilaterally no palpable or Doppler pulses to the lower extremities      Labs:     Recent Labs     02/02/22  1355   WBC 3.8*   HGB 14.6   HCT 45.1   PLT 99*     Recent Labs     02/02/22  1355      K 5.9*      CO2 22   *   CREATININE 3.0*   CALCIUM 9.4     Recent Labs     02/02/22  1355   AST 15   ALT 7*   BILITOT 1.7*   ALKPHOS 124     Recent Labs     02/02/22  1355   INR 1.29*     No results for input(s): Ismael Washington in the last 72 hours. Urinalysis:    No results found for: Janus Barrington, BACTERIA, RBCUA, BLOODU, [address], [address]    Radiology:       CTA ABDOMINAL AORTA W BILAT RUNOFF W CONTRAST   Final Result   Severe inflow disease with occlusion of the abdominal aorta just below the   takeoff of the superior mesenteric artery, occlusion of the aorto bi-iliac   stent graft and both common, internal, and external iliac arteries. Outflow disease bilaterally with moderate to severe stenosis of the right   superficial femoral artery at its origin and moderate stenosis of the left   superficial femoral artery at its origin. There is mild to moderate   multilevel stenosis of the superficial femoral arteries which remain patent. Runoff vessels are diminutive with single vessel runoff to the left foot and   3 vessel runoff to the right foot. Small right and trace left pleural effusions.       Fluid is seen within nondilated small large bowel and there appears to be   mild wall thickening involving the ascending colon, poorly evaluated due to   adjacent fluid and lack of oral contrast.  Findings may be related to mild   enterocolitis. Small volume ascites. ASSESSMENT:    Active Hospital Problems    Diagnosis Date Noted    Occlusion of aorta ([address] [I70.0] 02/02/2022         PLAN:    Complete Occlusion of Abdominal Aorta  - ED provider consulted with Dr Mansi Brown once CTA images resulted: Complete occlusion of abdominal aorta with concern for thrombus. Decreased blood flow to bilateral kidneys  - Recommendations from vascular surgery was major open surgery which is risky and may not be successful.  - Conversation was had between Dr. Mansi Brown, Dr. Shaye Broussard, the patient and his wife. Patient does not want surgery. They discussed options for hospice care and do not feel the patient is able to walk and would prefer inpatient hospice tonight. - will consult palliateive care and hospice  - comfort care medicines ordered    Diet: ADULT DIET; Regular  Code Status: DNR-CC    Dispo - Inpatient       [address], APRN - CNP    Thank you Hugo Solano MD for the opportunity to be involved in this patient's care. If you have any questions or concerns please feel free to contact me at [phone].

## 2022-02-03 NOTE — PROGRESS NOTES
4 Eyes Skin Assessment     NAME:  Chirag Cabrales  YOB: 1950  MEDICAL RECORD NUMBER:  [id number]    The patient is being assess for  Admission    I agree that 2 RN's have performed a thorough Head to Toe Skin Assessment on the patient. ALL assessment sites listed below have been assessed. Areas assessed by both nurses:    Head, Face, Ears, Shoulders, Back, Chest, Arms, Elbows, Hands, Sacrum. Buttock, Coccyx, Ischium and Legs. Feet and Heels        Does the Patient have a Wound? Yes wound(s) were present on assessment.  LDA wound assessment was Initiated and completed        Yusef Prevention initiated:  Yes   Wound Care Orders initiated:  No    Pressure Injury (Stage 3,4, Unstageable, DTI, NWPT, and Complex wounds) if present place consult order under [de-identified] No    New and Established Ostomies if present place consult order under : No      Nurse 1 eSignature: Electronically signed by Yung Henning RN on 2/3/22 at 5:42 AM EST    **SHARE this note so that the co-signing nurse is able to place an eSignature**    Nurse 2 eSignature: Electronically signed by Seun Melo RN on 2/3/22 at 5:45 AM EST

## 2022-02-03 NOTE — PROGRESS NOTES
Pt admitted to 4280 via stretcher. Pt is A&O x 4 with moments of forgetfulness. Pt denies pain. Blister and skin tear noted to right leg open to air. Pt's wife and daughter present at bedside. Marilee Nunez (pt's daughter) will be staying the night. Pt and family oriented to room, call light, fall precautions and POC.    Electronically signed by Francesco Perez RN on 2/3/2022 at 1:02 AM

## 2022-02-03 NOTE — PROGRESS NOTES
Pt becoming anxious. Pt states, \"I want to call the whole thing off\". Pt advised that the doctors would be in today and for him to express his concerns to them. Ativan given per PRN order.

## 2022-02-03 NOTE — PROGRESS NOTES
Pt awake and agitated, attempting to get out of bed to use the bathroom. Pt's daughter attempted to help pt but needed further assistance from staff d/t pt being noncompliant and trying to sit in the chair or on the couch. Staff assisted pt back to bed and pt medicated with Ativan per PRN order.

## 2022-02-03 NOTE — PROGRESS NOTES
Pt desats to 87% on room air, pt reports shortness of breath. Applied 3L nasal cannula, sats up to 93%. Appears more comfortable. Alternating PRN morphine and ativan throughout shift, wife at bedside.

## 2022-02-03 NOTE — CONSULTS
Talked with family, referral to [address]. Family wants home hospice; hospice updated.     Felecia Porter RN, BSN,   [phone]  Electronically signed by Felecia Porter RN on 2/3/2022 at 9:09 AM

## 2022-02-03 NOTE — PROGRESS NOTES
Post admission day 1 patient has been made hospice  Vitals:    02/03/22 0026 02/03/22 0637 02/03/22 0711 02/03/22 0824   BP: 120/86 (!) 125/90     Pulse: 101 101     Resp: 24 22 18   Temp: 97.4 °F (36.3 °C) 97.4 °F (36.3 °C)     TempSrc: Axillary Oral     SpO2: 90% 91%  92%   Weight:   190 lb 7.6 oz (86.4 kg)      Patient looks much more out of bed did wake up wants to shake my hand. But seems to be going downhill mouth open wide kind of an O sign. Legs still cold no Doppler signals ulceration on the right, however they are not acutely ischemic I believe his distal aortic occlusion has been chronic. Perhaps he has had significant back pain that this is related to his occluded renal arteries and possible infarction of the kidneys. I explained this to the wife with the patient pretty much asleep drawing her pictures of how the aorta the aneurysm the renal arteries are all totally occluded at there is a small channel and a partially occluded proximal aorta feeding his mesenteric vessels is why I suspect he does not have mesenteric ischemia. Confirmed with the wife that the patient would never want dialysis would never want leg amputations would never want a big operation to try to bypass his occluded aorta such as an axillobifemoral.  Ejection fraction last time it was checked was 25% anesthesia is grave fears that he would not survive the operation anyway'  Also pointed out that the stent graft is not even in contact with the aortic wall and so a thrombectomy or anything like that to try to clear out the clot would only reactivate his expanding aneurysm.     Agree with patient wife and medicine that hospice is strongly indicated would keep him comfortable and I suspect his renal failure will be the leading cause of his demise

## 2022-02-03 NOTE — PROGRESS NOTES
Hospitalist Progress Note      PCP: Gema Mahoney MD    Date of Admission: 2/2/2022    Chief Complaint: Abdominal pain    Hospital Course: 67 y.o. male with PMHx of CAD, AAA repair 2016, cardiomyopathy EF 25%, HTN, HLD and PAD presented to [facility] with bilateral leg and foot pain. Pain has progressively worsened and has developed ulcer to right lower leg. Patient denies fever, chills, nausea or vomiting. He does report some abdominal pain over the last few days around the same time his leg pain began. He is poor historian and difficult to obtain history.     ED work-up revealed occlusion of the abdominal aorta with concern for thrombus. Decreased blood flow to bilateral kidneys with GABRIELA. Patient also has hyperkalemia which was treated. ED provider discussed the case with vascular surgery and a heparin drip was initiated.     .Hospice consulted they are outside the door planning on visiting the patient shortly    Subjective: Patient still having some anxiety although he is somewhat sleepy. Wife is present      Medications:  Reviewed    Infusion Medications    sodium chloride       Scheduled Medications    nicotine  1 patch TransDERmal Daily    sodium chloride flush  5-40 mL IntraVENous 2 times per day     PRN Meds: albuterol, sodium chloride flush, sodium chloride, acetaminophen, morphine, LORazepam      Intake/Output Summary (Last 24 hours) at 2/3/2022 1315  Last data filed at 2/2/2022 1717  Gross per 24 hour   Intake 250 ml   Output --   Net 250 ml       Physical Exam Performed:    BP (!) 125/90   Pulse 101   Temp 97.4 °F (36.3 °C) (Oral)   Resp 18   Wt 190 lb 7.6 oz (86.4 kg)   SpO2 92%   BMI 25.83 kg/m²     General appearance: Frail appearing male sitting in stretcher appears older than stated age  HEENT: Pupils equal, round, and reactive to light. Conjunctivae/corneas clear. Neck: Supple, with full range of motion. No jugular venous distention. Trachea midline.   Respiratory: Normal respiratory effort. Clear to auscultation, bilaterally without Rales/Wheezes/Rhonchi. Cardiovascular: Regular rate and rhythm with normal S1/S2 without murmurs, rubs or gallops. Abdomen: Soft, moderate tenderness. Pulsatile mass  Musculoskeletal: No clubbing, cyanosis or edema bilaterally. Full range of motion without deformity. Skin: Skin color, texture, turgor normal.  No rashes or lesions. Neurologic:  Neurovascularly intact without any focal sensory/motor deficits. Cranial nerves: II-XII intact, grossly non-focal.    Capillary Refill: Brisk,3 seconds, normal   Peripheral Pulses: +2 palpable, equal bilaterally       Labs:   Recent Labs     02/02/22  1355   WBC 3.8*   HGB 14.6   HCT 45.1   PLT 99*     Recent Labs     02/02/22  1355      K 5.9*      CO2 22   *   CREATININE 3.0*   CALCIUM 9.4     Recent Labs     02/02/22  1355   AST 15   ALT 7*   BILITOT 1.7*   ALKPHOS 124     Recent Labs     02/02/22  1355   INR 1.29*     No results for input(s): CKTOTAL, TROPONINI in the last 72 hours. Urinalysis:    No results found for: Patricia Beau, BACTERIA, RBCUA, BLOODU, [address], [address]    Radiology:  CTA ABDOMINAL AORTA W BILAT RUNOFF W CONTRAST   Final Result   Severe inflow disease with occlusion of the abdominal aorta just below the   takeoff of the superior mesenteric artery, occlusion of the aorto bi-iliac   stent graft and both common, internal, and external iliac arteries. Outflow disease bilaterally with moderate to severe stenosis of the right   superficial femoral artery at its origin and moderate stenosis of the left   superficial femoral artery at its origin. There is mild to moderate   multilevel stenosis of the superficial femoral arteries which remain patent. Runoff vessels are diminutive with single vessel runoff to the left foot and   3 vessel runoff to the right foot. Small right and trace left pleural effusions.       Fluid is seen within nondilated small large bowel and there appears to be   mild wall thickening involving the ascending colon, poorly evaluated due to   adjacent fluid and lack of oral contrast.  Findings may be related to mild   enterocolitis. Small volume ascites. Assessment/Plan:    Enlarging abdominal aortic aneurysm with occlusion-no aggressive intervention. Family does not want anything more aggressive done. Hospice care  Pain control  Dr. Butler Reach timely consultation and recommendations and care greatly appreciated  Chronic systolic heart failure-low ejection fraction 25% compensated for now    Acute on chronic renal failure-worsening creatinine up to 3. Not interested in hemodialysis    Based on disease processes predicted course patient has a life expectancy of less than 6 months. Hospice is completely appropriate. Likely patient will die in the last several weeks to days. I have written for comfort meds. Family's wishes for patient to go home and die at home. I have coordinated care with hospice and we are anticipating discharge in next 24 hours. We are working on hospital bed    DVT Prophylaxis: lovenox  Diet: ADULT DIET;  Regular  Code Status: DNR-CC        Dispo - tomorrow    Fazal Hoff MD

## 2022-02-04 NOTE — PROGRESS NOTES
Patient's spouse at bedside refused vitals for the beginning of the shift. Patient resting quietly in bed, , O2 98% RR 22. Will continue to monitor.

## 2022-02-04 NOTE — PLAN OF CARE
Problem: Falls - Risk of:  Goal: Will remain free from falls  Description: Will remain free from falls  2/4/2022 0149 by Leroy Webster RN  Outcome: Ongoing  Goal: Absence of physical injury  Description: Absence of physical injury  2/4/2022 0149 by Leroy Webster RN  Outcome: Ongoing    Problem: Skin Integrity:  Goal: Will show no infection signs and symptoms  Description: Will show no infection signs and symptoms  2/4/2022 0149 by Leroy Webster RN  Outcome: Ongoing  Goal: Absence of new skin breakdown  Description: Absence of new skin breakdown  2/4/2022 0149 by Leroy Webster RN  Outcome: Ongoing    Problem: Discharge Planning:  Goal: Discharged to appropriate level of care  Description: Discharged to appropriate level of care  2/4/2022 0149 by Leroy Webster RN  Outcome: Ongoing

## 2022-02-04 NOTE — PROGRESS NOTES
Brief Nutrition Note    + Screen for MST 2. Noted pt to go home with hospice, therefore patient will be followed at low nutrition risk. Dietitian will sign off. If nutrition intervention is required, please submit a dietary consult.     Electronically signed by Blake Mcallister RD, LUANA on 2/4/2022 at 8:15 AM

## 2022-02-04 NOTE — DEATH NOTES
Death Pronouncement Note  Patient's Name: Case Mckoy   Patient's YOB: 1950  MRN Number: [id number]    Admitting Provider: Joce Lei MD  Attending Provider: Gregg Wesley MD    Patient was examined and the following were absent: Pulses, Blood Pressure and Respiratory effort    I declared the patient dead on  at  2/4/2022 at 1:10 pm      Preliminary Cause of Death:   Abdominal Aortic Aneurysm    Electronically signed by Gregg Wesley MD on 2/4/22 at 2:21 PM EST

## 2022-02-04 NOTE — CARE COORDINATION
Discharge Plan:  Patient to discharge Home with [address]( [address])   Erum Flower with [address] is making arrangements for DME to be delivered Bed etc and then she will set up transportation- [address] aware of discharge order.   Yuki SORTO,MSW   [phone]

## 2022-02-04 NOTE — PROGRESS NOTES
Wife at bedside, called for this RN to come into room. Patient not breathing, no heart tones noted on auscultation with 2 RN's. Dr Caroll Felty notified, death pronounced at 1:10pm.  notified.

## 2022-02-05 NOTE — PROGRESS NOTES
Family set up transport per [employer] [employer] for bequeathal of body. Security aware. Patient picked up by transport at this time to go to [facility]. Family aware.

## 2022-02-06 LAB
BLOOD CULTURE, ROUTINE: NORMAL
CULTURE, BLOOD 2: NORMAL

## 2022-02-06 NOTE — DISCHARGE SUMMARY
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 Patient was found to have complete occlusion of both renal arteries and aortic stent graft. Patient was found to have worsening renal failure. After discussion with patient they opted for conservative comfort palliative care measures. Comfort meds were initiated. Patient was seen by hospice here in the hospital as a consult  Patient was planned for discharge to hospice at home but he  here in the hospital    Consults:  Rocael TO CASE MANAGEMENT    Signed:  Kaz Del Angel MD MD   2022    Thank you Yuki Patel MD for the opportunity to be involved in this patient's care. If you have any questions or concerns please feel free to contact me at [phone].